# Patient Record
Sex: MALE | Race: ASIAN
[De-identification: names, ages, dates, MRNs, and addresses within clinical notes are randomized per-mention and may not be internally consistent; named-entity substitution may affect disease eponyms.]

---

## 2021-01-04 ENCOUNTER — HOSPITAL ENCOUNTER (OUTPATIENT)
Age: 51
End: 2021-01-04
Payer: OTHER GOVERNMENT

## 2021-01-04 DIAGNOSIS — Z20.822: Primary | ICD-10-CM

## 2021-01-04 PROCEDURE — 87635 SARS-COV-2 COVID-19 AMP PRB: CPT

## 2021-01-06 ENCOUNTER — HOSPITAL ENCOUNTER (OUTPATIENT)
Age: 51
Discharge: HOME | End: 2021-01-06
Payer: OTHER GOVERNMENT

## 2021-01-06 VITALS
HEART RATE: 88 BPM | OXYGEN SATURATION: 98 % | DIASTOLIC BLOOD PRESSURE: 85 MMHG | SYSTOLIC BLOOD PRESSURE: 124 MMHG | RESPIRATION RATE: 18 BRPM | TEMPERATURE: 98.6 F

## 2021-01-06 VITALS
DIASTOLIC BLOOD PRESSURE: 72 MMHG | HEART RATE: 71 BPM | RESPIRATION RATE: 12 BRPM | OXYGEN SATURATION: 89 % | TEMPERATURE: 97.8 F | SYSTOLIC BLOOD PRESSURE: 118 MMHG

## 2021-01-06 VITALS
DIASTOLIC BLOOD PRESSURE: 73 MMHG | HEART RATE: 75 BPM | SYSTOLIC BLOOD PRESSURE: 115 MMHG | RESPIRATION RATE: 14 BRPM | OXYGEN SATURATION: 97 %

## 2021-01-06 VITALS
OXYGEN SATURATION: 100 % | SYSTOLIC BLOOD PRESSURE: 151 MMHG | TEMPERATURE: 98.3 F | RESPIRATION RATE: 14 BRPM | DIASTOLIC BLOOD PRESSURE: 90 MMHG | HEART RATE: 90 BPM

## 2021-01-06 VITALS
DIASTOLIC BLOOD PRESSURE: 68 MMHG | OXYGEN SATURATION: 99 % | RESPIRATION RATE: 89 BRPM | SYSTOLIC BLOOD PRESSURE: 117 MMHG | HEART RATE: 73 BPM

## 2021-01-06 VITALS
HEART RATE: 70 BPM | OXYGEN SATURATION: 100 % | DIASTOLIC BLOOD PRESSURE: 75 MMHG | SYSTOLIC BLOOD PRESSURE: 116 MMHG | RESPIRATION RATE: 12 BRPM

## 2021-01-06 VITALS
OXYGEN SATURATION: 100 % | RESPIRATION RATE: 20 BRPM | HEART RATE: 82 BPM | SYSTOLIC BLOOD PRESSURE: 141 MMHG | DIASTOLIC BLOOD PRESSURE: 78 MMHG

## 2021-01-06 VITALS
SYSTOLIC BLOOD PRESSURE: 111 MMHG | DIASTOLIC BLOOD PRESSURE: 71 MMHG | OXYGEN SATURATION: 98 % | TEMPERATURE: 98.06 F | RESPIRATION RATE: 16 BRPM | HEART RATE: 82 BPM

## 2021-01-06 VITALS — BODY MASS INDEX: 13428.1 KG/M2

## 2021-01-06 DIAGNOSIS — D12.3: ICD-10-CM

## 2021-01-06 DIAGNOSIS — K64.1: ICD-10-CM

## 2021-01-06 DIAGNOSIS — K92.1: Primary | ICD-10-CM

## 2021-01-06 DIAGNOSIS — I10: ICD-10-CM

## 2021-01-06 DIAGNOSIS — G47.33: ICD-10-CM

## 2021-01-06 PROCEDURE — 45385 COLONOSCOPY W/LESION REMOVAL: CPT

## 2021-01-06 PROCEDURE — 0DJD8ZZ INSPECTION OF LOWER INTESTINAL TRACT, VIA NATURAL OR ARTIFICIAL OPENING ENDOSCOPIC: ICD-10-PCS | Performed by: INTERNAL MEDICINE

## 2021-09-29 ENCOUNTER — HOSPITAL ENCOUNTER (OUTPATIENT)
Dept: HOSPITAL 76 - DI | Age: 51
Discharge: HOME | End: 2021-09-29
Attending: STUDENT IN AN ORGANIZED HEALTH CARE EDUCATION/TRAINING PROGRAM
Payer: COMMERCIAL

## 2021-09-29 DIAGNOSIS — S43.492A: ICD-10-CM

## 2021-09-29 DIAGNOSIS — M75.112: Primary | ICD-10-CM

## 2021-09-29 NOTE — MRI REPORT
PROCEDURE:  Shoulder LT W/O

 

INDICATIONS:  PAIN IN RIGHT SHOULDER

 

TECHNIQUE:  

Noncontrast oblique coronal T2 fast spin echo with fat saturation, oblique sagittal T1 spin echo and 
T2 fast spin echo with fat saturation, axial T1 spin echo and T2 fast spin echo with fat saturation t
hrough the shoulder.  

 

COMPARISON:  None.

 

FINDINGS:  

Rotator cuff:    

Mild supraspinatus tendinopathy with partial thickness articular sided tear. Mild infraspinatus tendi
nopathy. Teres minor appears intact. Subscapularis tendon appears intact. No atrophy of the rotator c
uff muscles.

 

Bones and bursae:

No bone marrow contusions or fractures.  

Mild glenohumeral and moderate acromioclavicular joint degeneration.  

The acromion demonstrates conventional anatomy, without an os acromiale.  

No subacromial/subdeltoid bursal fluid is present. 

 

Capsule and soft tissues:

Labrum: Ill-defined appearance of the anterior labrum, with adjacent glenoid rim spurring and scleros
is. There is also mild ill-defined fraying of the posterior segment.

Ligaments/capsule: Superior and inferior coracohumeral ligament appear intact.

Long head biceps tendon: Long head biceps tendon appears intact.

Rotator interval: Normal signal intensity. 

Coracohumeral ligament: Not well seen, raising possibility of sprain or rupture

 

IMPRESSION:

Mild partial thickness articular sided rotator cuff tear as above.

 

Chronic appearing tear of the anterior labrum and probable age appropriate fraying of the posterior s
egment.

 

Reviewed by: Pool Kim MD on 9/29/2021 4:20 PM PDT

Approved by: Pool Kim MD on 9/29/2021 4:20 PM PDT

 

 

Station ID:  [id number]

## 2023-03-03 ENCOUNTER — HOSPITAL ENCOUNTER (OUTPATIENT)
Dept: HOSPITAL 76 - EMS | Age: 53
Discharge: ANOTHER HOSPITAL | End: 2023-03-03
Payer: COMMERCIAL

## 2023-03-03 DIAGNOSIS — R00.0: ICD-10-CM

## 2023-03-03 DIAGNOSIS — R07.9: Primary | ICD-10-CM

## 2023-03-03 DIAGNOSIS — R42: ICD-10-CM

## 2023-03-03 DIAGNOSIS — R11.0: ICD-10-CM

## 2023-09-05 ENCOUNTER — HOSPITAL ENCOUNTER (OUTPATIENT)
Dept: HOSPITAL 76 - DI | Age: 53
Discharge: HOME | End: 2023-09-05
Attending: STUDENT IN AN ORGANIZED HEALTH CARE EDUCATION/TRAINING PROGRAM
Payer: OTHER GOVERNMENT

## 2023-09-05 DIAGNOSIS — I10: Primary | ICD-10-CM

## 2023-09-05 DIAGNOSIS — E78.5: ICD-10-CM

## 2023-09-05 DIAGNOSIS — R07.9: ICD-10-CM

## 2023-09-05 PROCEDURE — 93017 CV STRESS TEST TRACING ONLY: CPT

## 2023-09-05 NOTE — CARDIAC PROCEDURE NOTE
Stress Test Report


Service Date: 09/05/23


Service Time: 11:00


Ordering Provider: Venkat Carey PA-C


Indication for Test: 


Assess episodic atypical chest discomfort.





Significant Medical History: 


Wilfrde is referred for an ETT today to assess episodic anterior chest pain that 

he describes as sharp, retrosternal and pleuritic in nature; it is worse with 

movement (for example twisting his thorax around), inspiration and can be 

elicited by deep palpation over his chest wall.  He was previously a Masters 

level participant in various martial arts and in his early 40s sustained a 

severe chest wall crush injury associated with dislocation of his left shoulder 

during a martial arts activity.  He has had intermittent chest discomfort since 

that time.  This has become more of an issue in recent months following a more 

severe episode that occurred in March of this year prompting a visit to the 

[facility], where EKG was nonischemic and troponin 

negative. Of note, his potassium level was quite low (2.9) without a clear ex

planation and he has tried to replete the levels with enhancement of his diet in

potassium-rich foods. He has not had a repeat K+ level, however.  In recent 

times he has tried to not to pay so much attention to the discomfort and feels 

like it is less vexing now. He resumed recreational jogging and has not had any 

exertionally-triggered chest pain.  He is occasionally aware of palpitations but

denies undue shortness of breath nor reduction in his exertional tolerance. As 

detailed below he was previously treated with antihypertensive medication, but 

that was discontinued many years ago due to poor tolerance and blood pressures 

came down with weight loss.  He was recently started on 40 mg rosuvastatin 

daily, which he has been taking with some concern for lower extremity leg muscle

cramping.





Cardiac Risk Factors: 


Positive for history of hypertension (with prior medication treatment that was 

poorly tolerated, resulting in aggressive efforts at weight loss through 

intermittent fasting; wt. decreased by 25 lbs with BP decreasing from 160/100 

range to 130's/80's), positive for hyperlipidemia; negative for diabetes, prior 

smoking history or known family history of CAD. 





Type of Stress Test: Exercise Treadmill Test (ETT)


Procedure: 


-Exercise Treadmill Test-





After signing informed consent, the patient performed treadmill exercise using a

[place] protocol. The patient exercised for 8 minutes 52 seconds and achieved a 

peak heart rate of 160 (95 percent predicted maximum heart rate for age), and an

estimated workload of 10.2 METS.





The test was terminated due to fatigue/shortness of breath.





Resting heart rate: 70   Peak heart rate: 160   Normal response to exercise.


Resting BP: 130/88   Peak BP: 178/80   Normal response of sytolic and diastolic 

BPs to exercise.





Rhythm during exercise: Sinus rhythm throughout without ventricular ectopy. 


Symptoms: He reported dyspnea and pounding heart beats but denied any chest 

discomfort whatsoever.





EKG at rest showed normal sinus rhythm with some ST segments as much as 0.5 mm 

elevated, likely due to early repolarization and interpretable from ischemia 

standpoint.


EKG at peak stress showed J-point depression with upsloping ST segments, 

borderline but likely NOT reaching diagnostic EKG criteria for ischemia.


In Recovery heart rate rapidly/normally decreased with slower return of blood 

pressures  towards baseline levels.





No imaging was ordered with this stress test.





I, Chase Roldan MD, was present throughout this treadmill stress study and 

supervised it in its entirety.





Summary: 


1) Exercise tolerance minimally below average for age as evidenced by KHADIJAH of 

11%.


2) Normal resting EKG.


3) Adequate level of exercise was achieved on this treadmill stress test. 


4) Normal BP response to exercise.


5) Borderline ischemic changes by EKG criteria were seen at peak stress. The 

computer analysis suggests that mild ST depression was present at the diagnostic

time of 60ms past the J-point but the overall pattern does not suggest true 

ischemia. It is possible that motion artifact and/or persistent hypokalemia may 

have contributed.


6) No imaging was ordered with this test.





Conclusions and Recommendations:


1) Overall this is a reassuring study, given near average exercise time for age 

and no occurrence of his nonexertional chest discomfort (which seems highly 

likely due to longstanding chest wall abnormalities). Although the EKG response 

was borderline I would not repeat the study with imaging, unless the discomfort 

evolves to exertional in nature.


2) I am recommending continued attention to blood pressure surveillance, 

maintenance of ideal body weight and continue use of rosuvastatin at a dose that

is not implicated in causing leg cramps. For many patients taking the same dose 

of rosuvastatin every other day can lead to reduction in musculoskeletal side 

effects with no significant change in lipid panel results versus daily dosing, 

so would recommend this change.


3) He should also have a full chemistry panel to assess electrolytes and 

transaminases. If potassium remains significantly reduced he should have a 

course of replacement therapy and be followed with serial levels (after, for 

example, a month of treatment, and again several weeks later off treatment). If 

he does not return to mid normal with replacement OR again becomes hypokalemic 

after treatment is concluded this should be further investigated, potentially by

a Nephrologist.

## 2024-11-07 ENCOUNTER — HOSPITAL ENCOUNTER (OUTPATIENT)
Age: 54
End: 2024-11-07
Payer: OTHER GOVERNMENT

## 2024-11-07 DIAGNOSIS — Z79.899: ICD-10-CM

## 2024-11-07 DIAGNOSIS — E78.00: ICD-10-CM

## 2024-11-07 DIAGNOSIS — Z00.00: Primary | ICD-10-CM

## 2024-11-07 DIAGNOSIS — Z83.3: ICD-10-CM

## 2024-11-07 DIAGNOSIS — I10: ICD-10-CM

## 2024-11-07 LAB
ADD MANUAL DIFF / SLIDE REVIEW: NO
ALBUMIN SERPL-MCNC: 4.3 G/DL (ref 3.5–5)
ALBUMIN/GLOB SERPL: 1.4 {RATIO} (ref 1–2.8)
ALP SERPL-CCNC: 62 U/L (ref 38–126)
ALT SERPL-CCNC: 35 IU/L (ref ?–50)
BUN SERPL-MCNC: 13 MG/DL (ref 9–20)
CALCIUM SERPL-MCNC: 9.5 MG/DL (ref 8.4–10.2)
CHLORIDE SERPL-SCNC: 101 MMOL/L (ref 98–107)
CHOLEST SERPL-MCNC: 209 MG/DL (ref 140–199)
CO2 SERPL-SCNC: 27 MMOL/L (ref 22–32)
ESTIMATED GLOMERULAR FILT RATE: > 60 ML/MIN (ref 60–?)
GLOBULIN SER CALC-MCNC: 3.1 G/DL (ref 1.7–4.1)
GLUCOSE SERPL-MCNC: 91 MG/DL (ref 70–100)
HBA1C MFR BLD: 5.5 % (ref 4–6)
HDLC SERPL-MCNC: 101 MG/DL (ref 40–60)
HEMATOCRIT: 46.5 % (ref 41–53)
HEMOGLOBIN: 15.6 G/DL (ref 13.5–17.5)
HEMOLYSIS: < 15 (ref 0–50)
LYMPHOCYTES # SPEC AUTO: 2500 /UL (ref 1100–4500)
MCV RBC: 92.2 FL (ref 80–100)
MEAN CORPUSCULAR HEMOGLOBIN: 30.9 PG (ref 26–34)
MEAN CORPUSCULAR HGB CONC: 33.5 % (ref 30–36)
PLATELET COUNT: 249 X10^3/UL (ref 150–400)
POTASSIUM SERPL-SCNC: 4.1 MMOL/L (ref 3.4–5.1)
PROT SERPL-MCNC: 7.4 G/DL (ref 6.3–8.2)
SODIUM SERPL-SCNC: 135 MMOL/L (ref 137–145)
TRIGL SERPL-MCNC: 75 MG/DL (ref 35–150)

## 2024-11-07 PROCEDURE — 85025 COMPLETE CBC W/AUTO DIFF WBC: CPT

## 2024-11-07 PROCEDURE — 80061 LIPID PANEL: CPT

## 2024-11-07 PROCEDURE — 83036 HEMOGLOBIN GLYCOSYLATED A1C: CPT

## 2024-11-07 PROCEDURE — 80053 COMPREHEN METABOLIC PANEL: CPT
